# Patient Record
Sex: MALE | Race: WHITE | NOT HISPANIC OR LATINO | URBAN - NONMETROPOLITAN AREA
[De-identification: names, ages, dates, MRNs, and addresses within clinical notes are randomized per-mention and may not be internally consistent; named-entity substitution may affect disease eponyms.]

---

## 2017-01-24 RX ORDER — ATORVASTATIN CALCIUM 10 MG/1
TABLET, FILM COATED ORAL
Qty: 30 TABLET | OUTPATIENT
Start: 2017-01-24

## 2017-01-25 RX ORDER — ATORVASTATIN CALCIUM 10 MG/1
TABLET, FILM COATED ORAL
Qty: 30 TABLET | OUTPATIENT
Start: 2017-01-25

## 2017-01-26 RX ORDER — ATORVASTATIN CALCIUM 10 MG/1
TABLET, FILM COATED ORAL
Qty: 30 TABLET | OUTPATIENT
Start: 2017-01-26

## 2017-02-20 RX ORDER — VARENICLINE TARTRATE 1 MG/1
TABLET, FILM COATED ORAL
Qty: 56 TABLET | Refills: 0 | OUTPATIENT
Start: 2017-02-20

## 2017-02-22 RX ORDER — VARENICLINE TARTRATE 1 MG/1
TABLET, FILM COATED ORAL
Qty: 56 TABLET | OUTPATIENT
Start: 2017-02-22

## 2018-08-13 ENCOUNTER — OFFICE VISIT (OUTPATIENT)
Dept: UROLOGY | Facility: CLINIC | Age: 68
End: 2018-08-13

## 2018-08-13 VITALS
WEIGHT: 157 LBS | BODY MASS INDEX: 23.79 KG/M2 | SYSTOLIC BLOOD PRESSURE: 138 MMHG | TEMPERATURE: 97.4 F | HEIGHT: 68 IN | DIASTOLIC BLOOD PRESSURE: 70 MMHG

## 2018-08-13 DIAGNOSIS — R39.9 LOWER URINARY TRACT SYMPTOMS (LUTS): Primary | ICD-10-CM

## 2018-08-13 DIAGNOSIS — J44.9 CHRONIC OBSTRUCTIVE PULMONARY DISEASE, UNSPECIFIED COPD TYPE (HCC): ICD-10-CM

## 2018-08-13 DIAGNOSIS — R68.82 LOW LIBIDO: ICD-10-CM

## 2018-08-13 DIAGNOSIS — N40.0 HYPERPLASIA OF PROSTATE: ICD-10-CM

## 2018-08-13 LAB
BILIRUB BLD-MCNC: NEGATIVE MG/DL
CLARITY, POC: CLEAR
COLOR UR: YELLOW
GLUCOSE UR STRIP-MCNC: NEGATIVE MG/DL
KETONES UR QL: NEGATIVE
LEUKOCYTE EST, POC: NEGATIVE
NITRITE UR-MCNC: NEGATIVE MG/ML
PH UR: 6.5 [PH] (ref 5–8)
PROT UR STRIP-MCNC: NEGATIVE MG/DL
RBC # UR STRIP: NEGATIVE /UL
SP GR UR: 1.01 (ref 1–1.03)
UROBILINOGEN UR QL: NORMAL

## 2018-08-13 PROCEDURE — 81003 URINALYSIS AUTO W/O SCOPE: CPT | Performed by: UROLOGY

## 2018-08-13 PROCEDURE — 99204 OFFICE O/P NEW MOD 45 MIN: CPT | Performed by: UROLOGY

## 2018-08-13 RX ORDER — OXYBUTYNIN CHLORIDE 5 MG/1
5 TABLET ORAL 3 TIMES DAILY
COMMUNITY
End: 2018-08-21 | Stop reason: SINTOL

## 2018-08-13 RX ORDER — FINASTERIDE 5 MG/1
5 TABLET, FILM COATED ORAL DAILY
COMMUNITY
End: 2018-08-13 | Stop reason: HOSPADM

## 2018-08-13 RX ORDER — PREGABALIN 300 MG/1
300 CAPSULE ORAL 2 TIMES DAILY
COMMUNITY
End: 2020-10-08

## 2018-08-13 NOTE — PROGRESS NOTES
Chief Complaint  LUTS    HPI  Mr. Ray is a 68 y.o. male with history of COPD who presents with LUTS. He is currently on disability d/t COPD.   Primary symptom includes: nocturia, urinary frequency,      Previous treatments include: TURP in Dec 2017 w/ Dr. Alexander;   He says this improved stream strength, but only has been intermittent; he has ORQUIDEA after this, but only occasionally;   He has dysuria for 2-3 mo after  He he also complains of anejaculation since the TURP, and is very disturbed by this.  He was unaware that that is one of the major side effects of this surgery.     yes Incontinence  no Constipation  yes Diarrhea - sometimes stool incontinence   yes Erectile dysfunction  no History of kidney stones    Pt also c/o low libido; he also admits to low energy and difficulty concentrating/difficulty with memory  He has been battling PTx, bronchitis, PNA, COPD for the past year    Erections 3-4/10; unable to penetrate  His partner is 48 and he desires to be sexually active  He has tried viagra in the past without     IPSS Questionnaire (AUA-7):  Over the past month…    1)  How often have you had a sensation of not emptying your bladder completely after you finish urinating?  1 - Less than 1 time in 5   2)  How often have you had to urinate again less than two hours after you finished urinating? 3 - About half the time   3)  How often have you found you stopped and started again several times when you urinated?  1 - Less than 1 time in 5   4) How difficult have you found it to postpone urination?  2 - Less than half the time   5) How often have you had a weak urinary stream?  4 - More than half the time   6) How often have you had to push or strain to begin urination?  2 - Less than half the time   7) How many times did you most typically get up to urinate from the time you went to bed until the time you got up in the morning?  3 - 3 times   Total score:  0-7 mildly symptomatic    8-19 moderately symptomatic - 16     20-35 severely symptomatic     Total bother score / QOL = 6, terrible    PVR - 51    Past Medical History  Past Medical History:   Diagnosis Date   • Acid reflux    • Arthritis    • COPD (chronic obstructive pulmonary disease) (CMS/[facility])    • High cholesterol    • HTN (hypertension)    • Hypertension    • Prostate disease      He denies cardiac disease or diabetes    O2 therapy has been recommended to him for his COPD, he currently undergoes pulm PT    He c/o short term memory issues    He cannot walk a mile on flat ground and is unsure of being able to do 2 flights of stairs    Past Surgical History  Past Surgical History:   Procedure Laterality Date   • HERNIA REPAIR     • PROSTATE SURGERY         Medications    Current Outpatient Prescriptions:   •  acetaminophen-codeine (TYLENOL #3) 300-30 MG per tablet, Take 1 tablet by mouth every 4 (four) hours as needed for moderate pain (4-6)., Disp: , Rfl:   •  amlodipine-olmesartan (CLAYTON) 10-40 MG per tablet, Take 1 tablet by mouth daily., Disp: , Rfl:   •  atorvastatin (LIPITOR) 10 MG tablet, Take 10 mg by mouth daily., Disp: , Rfl:   •  finasteride (PROSCAR) 5 MG tablet, Take 5 mg by mouth Daily., Disp: , Rfl:   •  oxybutynin (DITROPAN) 5 MG tablet, Take 5 mg by mouth 3 (Three) Times a Day., Disp: , Rfl:   •  potassium citrate (UROCIT-K) 5 MEQ (540 MG) CR tablet, Take 20 mEq by mouth daily., Disp: , Rfl:   •  pregabalin (LYRICA) 300 MG capsule, Take 300 mg by mouth 2 (Two) Times a Day., Disp: , Rfl:   •  varenicline (CHANTIX) 1 MG tablet, Take 1 mg by mouth 2 (two) times a day., Disp: , Rfl:   •  Vortioxetine HBr (TRINTELLIX) 10 MG tablet, Take  by mouth., Disp: , Rfl:     Allergies  No Known Allergies    Social History  Social History     Social History   • Marital status:      Spouse name: N/A   • Number of children: N/A   • Years of education: N/A     Occupational History   • Not on file.     Social History Main Topics   • Smoking status: Current Every Day  "Smoker   • Smokeless tobacco: Never Used   • Alcohol use No   • Drug use: No   • Sexual activity: Defer     Other Topics Concern   • Not on file     Social History Narrative   • No narrative on file     He is taking chantix to try and quit, but restarted smoking    Family History  He has no family history of prostate cancer  Family History   Problem Relation Age of Onset   • Hypertension Father    • Dementia Father    • Hypertension Mother    • Dementia Mother      Review of Systems  Constitutional: No fevers or chills  Skin: Negative for rash  Endocrine: No heat/cold intolerance   Cardiovascular: Negative for chest pain or dyspnea on exertion  Respiratory: Negative for shortness of breath or wheezing  Gastrointestinal: No nausea or vomiting  Genitourinary: Negative for current gross hematuria.  Musculoskeletal: No flank pain  Neurological:  Negative for frequent headaches or dizziness  Lymph/Heme: Negative for leg swelling or calf pain.    Physical Exam  Visit Vitals  /70   Temp 97.4 °F (36.3 °C)   Ht 172.7 cm (67.99\")   Wt 71.2 kg (157 lb)   BMI 23.88 kg/m²     Constitutional: NAD, WDWN.   HEENT: NCAT. Conjunctivae normal.  MMM.    Cardiovascular: Regular rate.  Pulmonary/Chest: Respirations are even and non-labored bilaterally.  Abdominal: Soft. No distension, tenderness, masses or guarding. No CVA tenderness.  Neurological: A + O x 3.  Cranial Nerves II-XII grossly intact. Normal gait.  Extremities: JARED x 4, Warm. No clubbing.  No cyanosis.    Skin: Pink, warm and dry.  No rashes noted.  Psychiatric:  Normal mood and affect    Genitourinary  Penis: circumcised penis, glans normal, no penile discharge.  No rashes/lesions.    Testes: descended bilaterally, no masses, nontender to palpation. Remainder of scrotal contents normal. No hernia appreciated.  Rectal:  Normal tone, no masses.  Prostate:  30 grams.  Asymmetric, non-tender, R base induration,       Labs  No results found for: PSA    Assessment  Mr. "  is a 68 y.o. male who presents with LUTS, primarily urinary frequency.    His initial complaint today was urinary urgency and frequency after TURP.  He is emptying his bladder well.  He is also very disturbed and frustrated by his lack of ejaculation.  Discussed with him that this is a known and likely irreversible side effect of TURP surgery.  I would like to investigate endoscopically has prostate, prior to administering any medications for OAB.  I am concerned about the side effects of many of the anticholinergic OAB medications and his memory, along with his polypharmacy.     He also complains today of erectile dysfunction and low libido.  We discussed that his low libido could possibly be a side effect from a number of medications that he takes, commonly finasteride and blood pressure medications like his ARB can be culprits. Additionally, some of his chronic medical problems could cause low libido.  He would like to pursue workup though with blood testing.     Plan  1.  PSA today  2.  Smoking cessation counseling provided  3.  Testosterone labs   4.  Fu Dr. Dewitt to discuss if he is healthy enough for sexual activity and to discuss any medication changes that could be made in order to improve his memory and help with libido.  5.  Stop finasteride, as one of the major side effects is low libido  6.  FU for cysto; hold off on any anticholinergics for now, doug w/ memory issues; may consider Myrbetriq in future   7.  Pt will check w/ PCP to get blessing for sexual activity, prior to discussing ICI or IPP at next visit; bring pump at next visit      Francisco Herzog MD

## 2018-08-15 LAB
ESTRADIOL SERPL-MCNC: 22.9 PG/ML (ref 7.6–42.6)
HCT VFR BLD AUTO: 48.4 % (ref 42–52)
HGB BLD-MCNC: 15.4 G/DL (ref 14–18)
PSA SERPL-MCNC: 0.1 NG/ML (ref 0.06–4)
SHBG SERPL-SCNC: 108.8 NMOL/L (ref 19.3–76.4)
TESTOST FREE SERPL-MCNC: 9.6 PG/ML (ref 6.6–18.1)
TESTOST SERPL-MCNC: 796 NG/DL (ref 264–916)

## 2018-08-21 ENCOUNTER — PROCEDURE VISIT (OUTPATIENT)
Dept: UROLOGY | Facility: CLINIC | Age: 68
End: 2018-08-21

## 2018-08-21 VITALS — WEIGHT: 157 LBS | HEIGHT: 68 IN | BODY MASS INDEX: 23.79 KG/M2

## 2018-08-21 DIAGNOSIS — N52.9 ERECTILE DYSFUNCTION, UNSPECIFIED ERECTILE DYSFUNCTION TYPE: Primary | ICD-10-CM

## 2018-08-21 DIAGNOSIS — R39.9 LOWER URINARY TRACT SYMPTOMS (LUTS): ICD-10-CM

## 2018-08-21 PROCEDURE — 99215 OFFICE O/P EST HI 40 MIN: CPT | Performed by: UROLOGY

## 2018-08-21 PROCEDURE — 52310 CYSTOSCOPY AND TREATMENT: CPT | Performed by: UROLOGY

## 2018-08-21 PROCEDURE — 51741 ELECTRO-UROFLOWMETRY FIRST: CPT | Performed by: UROLOGY

## 2018-08-21 PROCEDURE — 51798 US URINE CAPACITY MEASURE: CPT | Performed by: UROLOGY

## 2018-08-21 RX ORDER — TAMSULOSIN HYDROCHLORIDE 0.4 MG/1
1 CAPSULE ORAL DAILY
Qty: 30 CAPSULE | Refills: 3 | Status: SHIPPED | OUTPATIENT
Start: 2018-08-21 | End: 2019-01-02 | Stop reason: SDUPTHER

## 2018-08-21 NOTE — PROGRESS NOTES
Chief Complaint  LUTS  ED    HPI  Mr. Ray is a 68 y.o. male with history of COPD who presents for follow up of LUTS. He is currently on disability d/t COPD.   Primary symptom includes: nocturia, urinary frequency.     He says stopping his finasteride has not helped with his libido at all.   He complains of dry mouth with taking his oxybutynin.  Memory issues as below.  Has not had any recent urinary retention.  He does admit to weak urinary stream, but the frequency and urgency bothers him the most.     He says that after his TURP with Dr. Alexander, he never experienced increase in his urinary stream or improvement in his symptoms.       To Review,  Previous treatments include: TURP in Dec 2017 w/ Dr. Alexander;   He says this improved stream strength, but only has been intermittent; he has ORQUIDEA after this, but only occasionally;   He has dysuria for 2-3 mo after  He he also complains of anejaculation since the TURP, and is very disturbed by this.  He was unaware that that is one of the major side effects of this surgery.   Pt also c/o low libido; he also admits to low energy and difficulty concentrating/difficulty with memory  He has been battling PTx, bronchitis, PNA, COPD for the past year  Erections 3-4/10; unable to penetrate  His partner is 48 and he desires to be sexually active  He has tried viagra in the past without any results    IPSS Questionnaire (AUA-7):  Over the past month…    1)  How often have you had a sensation of not emptying your bladder completely after you finish urinating?  1 - Less than 1 time in 5   2)  How often have you had to urinate again less than two hours after you finished urinating? 3 - About half the time   3)  How often have you found you stopped and started again several times when you urinated?  1 - Less than 1 time in 5   4) How difficult have you found it to postpone urination?  2 - Less than half the time   5) How often have you had a weak urinary stream?  4 - More than half  the time   6) How often have you had to push or strain to begin urination?  2 - Less than half the time   7) How many times did you most typically get up to urinate from the time you went to bed until the time you got up in the morning?  3 - 3 times   Total score:  0-7 mildly symptomatic    8-19 moderately symptomatic - 16    20-35 severely symptomatic     Total bother score / QOL = 6, terrible    Past Medical History  Past Medical History:   Diagnosis Date   • Acid reflux    • Arthritis    • COPD (chronic obstructive pulmonary disease) (CMS/[facility])    • High cholesterol    • HTN (hypertension)    • Hypertension    • Prostate disease      He denies cardiac disease or diabetes  O2 therapy has been recommended to him for his COPD, he currently undergoes pulm PT  He c/o short term memory issues  He cannot walk a mile on flat ground and is unsure of being able to do 2 flights of stairs    Past Surgical History  Past Surgical History:   Procedure Laterality Date   • HERNIA REPAIR     • PROSTATE SURGERY     • TURP / TRANSURETHRAL INCISION / DRAINAGE PROSTATE  12/2017       Medications    Current Outpatient Prescriptions:   •  acetaminophen-codeine (TYLENOL #3) 300-30 MG per tablet, Take 1 tablet by mouth every 4 (four) hours as needed for moderate pain (4-6)., Disp: , Rfl:   •  amlodipine-olmesartan (CLAYTON) 10-40 MG per tablet, Take 1 tablet by mouth daily., Disp: , Rfl:   •  atorvastatin (LIPITOR) 10 MG tablet, Take 10 mg by mouth daily., Disp: , Rfl:   •  oxybutynin (DITROPAN) 5 MG tablet, Take 5 mg by mouth 3 (Three) Times a Day., Disp: , Rfl:   •  potassium citrate (UROCIT-K) 5 MEQ (540 MG) CR tablet, Take 20 mEq by mouth daily., Disp: , Rfl:   •  pregabalin (LYRICA) 300 MG capsule, Take 300 mg by mouth 2 (Two) Times a Day., Disp: , Rfl:   •  varenicline (CHANTIX) 1 MG tablet, Take 1 mg by mouth 2 (two) times a day., Disp: , Rfl:   •  Vortioxetine HBr (TRINTELLIX) 10 MG tablet, Take  by mouth., Disp: , Rfl:      Allergies  No Known Allergies    Social History  Social History     Social History   • Marital status:      Spouse name: N/A   • Number of children: N/A   • Years of education: N/A     Occupational History   • Not on file.     Social History Main Topics   • Smoking status: Current Every Day Smoker     Packs/day: 1.00     Types: Cigarettes   • Smokeless tobacco: Never Used   • Alcohol use No   • Drug use: No   • Sexual activity: Defer     Other Topics Concern   • Not on file     Social History Narrative   • No narrative on file     He is taking chantix to try and quit, but restarted smoking    Family History  He has no family history of prostate cancer  Family History   Problem Relation Age of Onset   • Hypertension Father    • Dementia Father    • Hypertension Mother    • Dementia Mother      Review of Systems  Constitutional: No fevers or chills  Skin: Negative for rash  Endocrine: No heat/cold intolerance   Cardiovascular: Negative for chest pain or dyspnea on exertion  Respiratory: Negative for shortness of breath or wheezing  Gastrointestinal: No nausea or vomiting  Genitourinary: Negative for current gross hematuria.  Musculoskeletal: No flank pain  Neurological:  Negative for frequent headaches or dizziness  Lymph/Heme: Negative for leg swelling or calf pain.    Physical Exam  There were no vitals taken for this visit.  Constitutional: NAD, WDWN.   HEENT: NCAT. Conjunctivae normal.  MMM.    Cardiovascular: Regular rate.  Pulmonary/Chest: Respirations are even and non-labored bilaterally.  Abdominal: Soft. No distension, tenderness, masses or guarding. No CVA tenderness.  Neurological: A + O x 3.  Cranial Nerves II-XII grossly intact. Normal gait.  Extremities: JARED x 4, Warm. No clubbing.  No cyanosis.    Skin: Pink, warm and dry.  No rashes noted.  Psychiatric:  Normal mood and affect    Genitourinary  Penis: circumcised penis, glans normal, no penile discharge.  No rashes/lesions.    Testes:  descended bilaterally, no masses, nontender to palpation. Remainder of scrotal contents normal.       Labs  Lab Results   Component Value Date    PSA 0.104 08/13/2018      Ref. Range 8/13/2018 10:45   Estradiol Latest Ref Range: 7.6 - 42.6 pg/mL 22.9   Sex Hormone Binding Globulin Latest Ref Range: 19.3 - 76.4 nmol/L 108.8 (H)   Testosterone, Total Latest Ref Range: 264 - 916 ng/dL 796   Testosterone, Free Latest Ref Range: 6.6 - 18.1 pg/mL 9.6       Preprocedure diagnosis  LUTS  Hx of TURP    Postprocedure diagnosis  Prostatic regrowth  Small bladder stones    Procedure  Flexible Cystourethroscopy    Attending surgeon  Francisco Herzog MD    Anesthesia  2% lidocaine jelly intraurethrally    Complications  None    Indications  68 y.o. male undergoing a flexible cystoscopy for the above mentioned indications.  Informed consent was obtained.      Findings  Cystoscopic findings included one right and left ureteral orifice in the normal anatomic position with normal bladder mucosa and no tumors, masses or stones. The urethral urothelium was within normal limits with no strictures.  There was a prominent median lobe and evidence of prostatic regrowth, which appeared obstructive.  There were several bladder stones which had migrated down into his prostatic urethra.  We removed one of these with a flexible stent grasper, and pushed the rest back into his bladder. He did have some narrowing at his bladder neck, though not an actual contracture    Procedure  The patient was placed in supine position and prepped and draped in sterile fashion with lidocaine jelly per urethra for anesthesia.  A timeout was performed.  The 14F flexible cystoscope was lubricated and gently placed through the penile urethra and into the bladder.  The bladder was completely visualized.  The cystoscope was retroflexed and the bladder neck and prostate visualized.  The cystoscope was slowly withdrawn while visualizing the urethra and the  procedure terminated.  The patient tolerated the procedure well.      PVR - 27    Uroflow:  Peak flow rate - 6.8  Average flow rate - 2.5  Flow curve - flat and staccato       Assessment  Mr. Ray is a 68 y.o. male who presents with LUTS, primarily urinary frequency. He also has ED and low libido. AM Testosterone was normal. At last visit we stopped finasteride.     I discussed with him that prior to any surgical intervention I would like to perform urodynamics.  I think that this is indicated in men continue to have LUTS after bladder outlet procedures.  I want to make sure that he can generate adequate bladder contractions prior to performing a another TURP for prostatic regrowth.  His COPD places him at surgical risk with anesthesia.     His PCP Dr. Dewitt has cleared him for sexual activity.    Plan  1.  Stop oxybutynin; start myrbetriq 50 mg QD and flomax  2.  We have ordered tri-mix injections and he will follow-up in 2 weeks for ICI teaching  3.  We will plan to perform urodynamics once I have this available.         Francisco Herzog MD

## 2018-11-05 ENCOUNTER — OFFICE VISIT (OUTPATIENT)
Dept: UROLOGY | Facility: CLINIC | Age: 68
End: 2018-11-05

## 2018-11-05 DIAGNOSIS — Z98.890 HISTORY OF PROSTATE SURGERY: ICD-10-CM

## 2018-11-05 DIAGNOSIS — N39.41 URGE INCONTINENCE OF URINE: ICD-10-CM

## 2018-11-05 DIAGNOSIS — R39.9 LOWER URINARY TRACT SYMPTOMS (LUTS): Primary | ICD-10-CM

## 2018-11-05 PROCEDURE — 51728 CYSTOMETROGRAM W/VP: CPT | Performed by: UROLOGY

## 2018-11-05 PROCEDURE — 51784 ANAL/URINARY MUSCLE STUDY: CPT | Performed by: UROLOGY

## 2018-11-05 PROCEDURE — 51797 INTRAABDOMINAL PRESSURE TEST: CPT | Performed by: UROLOGY

## 2018-11-05 PROCEDURE — 51741 ELECTRO-UROFLOWMETRY FIRST: CPT | Performed by: UROLOGY

## 2018-11-05 NOTE — PROGRESS NOTES
Diagnostic Tests    Complex Cystometrogram, Pressure Voiding Study with (intra-abdominal voiding pressure, rectal), EMG, Uroflowometry (Calibrated):    Pre-operative diagnosis: LUTS, primarily urinary frequency; prostatic regrowth on cystoscopy after TURP in the past   Surgeon: Francisco Herzog MD   Anesthesia: None   Complications: None     Briefly, Theo Ray is a 68 y.o. y.o. male with history of LUTS, primarily urinary frequency; prostatic regrowth on cystoscopy after TURP in the past . He presents today for urodynamics.   We discussed the risks and benefits of the procedure including the risks of hematuria and infection. Pressure flow urodynamics were therefore performed.     Operative summary:     Initial Uroflowmetry:  Voided volume - 88 ml  Q max - 6.5* ml/sec  PVR by staright cath - 0 ml    The patient was prepped and draped in a standard sterile fashion. A catheter was placed with a 7 [place] double-lumen urodynamic catherter. A rectal baloon pressure transducer was placed in an atraumatic fashion. An EMG electrode was placed at 3 and 9:00 of the elsa-anal region.     Filling cystometry:   Normal saline was infused at 50cc per minute. Pressure-flow urodynamics were performed.   The patient's first sensation and desires were well below 100 cc, and maximum cystometric capacity was 130 ml. There were +++ uninhibited bladder contractions. There was a slow rise in pressure with filling; the compliance was normal.  End filling pressure was normal.  We were not able to determine VLP P because he leaked with a large D.O. so early on.     Voiding cystometry with uroflowmetry (calibrated):   At maximum capacity the patient then voided.   Max Flow: 5.4 ml/s   Average flow: 3.3 ml/s   P det at max flow: 22 cm of water   Mean P det: 25 cm of water   Voided volume: 130 ml   PVR: 0 ml   EMG activity was normal   The voiding curve was unobstructed/equivocal      He was able to void with minimal PVR, though it was not  completely clear if he was recruiting  He was able to be re-filled to 130 before having large DO and leaking completely    Impression:   1. small bladder capacity   2. normal compliance   3. equivocal contractility   4. +++ detrussor instability   5. +++ detrussor overactivity incontinence   6. no stress urinary incontinence   7. no bladder outlet obstruction   8: no DSD    Plan   - FU to discuss results, I will likely recommend Botox

## 2018-12-18 ENCOUNTER — OFFICE VISIT (OUTPATIENT)
Dept: UROLOGY | Facility: CLINIC | Age: 68
End: 2018-12-18

## 2018-12-18 VITALS
BODY MASS INDEX: 23.79 KG/M2 | WEIGHT: 157 LBS | HEART RATE: 66 BPM | OXYGEN SATURATION: 91 % | HEIGHT: 68 IN | TEMPERATURE: 97.1 F

## 2018-12-18 DIAGNOSIS — R39.9 LOWER URINARY TRACT SYMPTOMS (LUTS): Primary | ICD-10-CM

## 2018-12-18 PROCEDURE — 99213 OFFICE O/P EST LOW 20 MIN: CPT | Performed by: UROLOGY

## 2018-12-18 RX ORDER — SODIUM CHLORIDE 9 MG/ML
100 INJECTION, SOLUTION INTRAVENOUS CONTINUOUS
Status: CANCELLED | OUTPATIENT
Start: 2018-12-18

## 2018-12-18 NOTE — PROGRESS NOTES
Chief Complaint  LUTS  ED    HPI  Mr. Ray is a 68 y.o. male with history of COPD who presents for follow up of LUTS. He is currently on disability d/t COPD.   Primary symptom includes: nocturia, urinary frequency.     He was found to have prostatic regrowth with median lobe and bladder stones on cystoscopy.  He was then found to have extreme detrusor overactivity on urodynamics.   He returns today to discuss surgical treatment of his continued lower urinary tract symptoms and bladder stones.   No recent fevers or urinary infection.    To review,  He says stopping his finasteride has not helped with his libido at all.   He complains of dry mouth with taking his oxybutynin.  Memory issues as below.  Has not had any recent urinary retention.  He does admit to weak urinary stream, but the frequency and urgency bothers him the most.   He says that after his TURP with Dr. Alexander, he never experienced increase in his urinary stream or improvement in his symptoms.   Previous treatments include: TURP in Dec 2017 w/ Dr. Alexander;   He says this improved stream strength, but only has been intermittent; he has ORQUIDEA after this, but only occasionally;   He has dysuria for 2-3 mo after  He he also complains of anejaculation since the TURP, and is very disturbed by this.  He was unaware that that is one of the major side effects of this surgery.   Pt also c/o low libido; he also admits to low energy and difficulty concentrating/difficulty with memory  He has been battling PTx, bronchitis, PNA, COPD for the past year  Erections 3-4/10; unable to penetrate  His partner is 48 and he desires to be sexually active  He has tried viagra in the past without any results    IPSS Questionnaire (AUA-7):  Over the past month…    1)  How often have you had a sensation of not emptying your bladder completely after you finish urinating?  1 - Less than 1 time in 5   2)  How often have you had to urinate again less than two hours after you finished  urinating? 3 - About half the time   3)  How often have you found you stopped and started again several times when you urinated?  1 - Less than 1 time in 5   4) How difficult have you found it to postpone urination?  2 - Less than half the time   5) How often have you had a weak urinary stream?  4 - More than half the time   6) How often have you had to push or strain to begin urination?  2 - Less than half the time   7) How many times did you most typically get up to urinate from the time you went to bed until the time you got up in the morning?  3 - 3 times   Total score:  0-7 mildly symptomatic    8-19 moderately symptomatic - 16    20-35 severely symptomatic     Total bother score / QOL = 6, terrible    Past Medical History  Past Medical History:   Diagnosis Date   • Acid reflux    • Arthritis    • COPD (chronic obstructive pulmonary disease) (CMS/Formerly [facility])    • High cholesterol    • HTN (hypertension)    • Hypertension    • Prostate disease      He denies cardiac disease or diabetes  O2 therapy has been recommended to him for his COPD, he currently undergoes pulm PT  He c/o short term memory issues  He cannot walk a mile on flat ground and is unsure of being able to do 2 flights of stairs    Past Surgical History  Past Surgical History:   Procedure Laterality Date   • HERNIA REPAIR     • PROSTATE SURGERY     • TURP / TRANSURETHRAL INCISION / DRAINAGE PROSTATE  12/2017       Medications    Current Outpatient Medications:   •  acetaminophen-codeine (TYLENOL #3) 300-30 MG per tablet, Take 1 tablet by mouth every 4 (four) hours as needed for moderate pain (4-6)., Disp: , Rfl:   •  amlodipine-olmesartan (CLAYTON) 10-40 MG per tablet, Take 1 tablet by mouth daily., Disp: , Rfl:   •  atorvastatin (LIPITOR) 10 MG tablet, Take 10 mg by mouth daily., Disp: , Rfl:   •  Mirabegron ER (MYRBETRIQ) 50 MG tablet sustained-release 24 hour 24 hr tablet, Take 50 mg by mouth Daily., Disp: 30 tablet, Rfl: 3  •  potassium citrate (UROCIT-K)  5 MEQ (540 MG) CR tablet, Take 20 mEq by mouth daily., Disp: , Rfl:   •  pregabalin (LYRICA) 300 MG capsule, Take 300 mg by mouth 2 (Two) Times a Day., Disp: , Rfl:   •  tamsulosin (FLOMAX) 0.4 MG capsule 24 hr capsule, Take 1 capsule by mouth Daily., Disp: 30 capsule, Rfl: 3  •  varenicline (CHANTIX) 1 MG tablet, Take 1 mg by mouth 2 (two) times a day., Disp: , Rfl:   •  Vortioxetine HBr (TRINTELLIX) 10 MG tablet, Take  by mouth., Disp: , Rfl:     Allergies  No Known Allergies    Social History  Social History     Socioeconomic History   • Marital status:      Spouse name: Not on file   • Number of children: Not on file   • Years of education: Not on file   • Highest education level: Not on file   Social Needs   • Financial resource strain: Not on file   • Food insecurity - worry: Not on file   • Food insecurity - inability: Not on file   • Transportation needs - medical: Not on file   • Transportation needs - non-medical: Not on file   Occupational History   • Not on file   Tobacco Use   • Smoking status: Current Every Day Smoker     Packs/day: 1.00     Types: Cigarettes   • Smokeless tobacco: Never Used   Substance and Sexual Activity   • Alcohol use: No   • Drug use: No   • Sexual activity: Defer   Other Topics Concern   • Not on file   Social History Narrative   • Not on file     He is taking chantix to try and quit, but restarted smoking    Family History  He has no family history of prostate cancer  Family History   Problem Relation Age of Onset   • Hypertension Father    • Dementia Father    • Hypertension Mother    • Dementia Mother      Review of Systems  Constitutional: No fevers or chills  Skin: Negative for rash  Endocrine: No heat/cold intolerance   Cardiovascular: Negative for chest pain or dyspnea on exertion  Respiratory: Negative for shortness of breath or wheezing  Gastrointestinal: No nausea or vomiting  Genitourinary: Negative for current gross hematuria.  Musculoskeletal: No flank  "pain  Neurological:  Negative for frequent headaches or dizziness  Lymph/Heme: Negative for leg swelling or calf pain.    Physical Exam  Visit Vitals  Pulse 66   Temp 97.1 °F (36.2 °C)   Ht 172.7 cm (67.99\")   Wt 71.2 kg (157 lb)   SpO2 91%   BMI 23.88 kg/m²     Constitutional: NAD, WDWN.   HEENT: NCAT. Conjunctivae normal.  MMM.    Cardiovascular: Regular rate.  Pulmonary/Chest: Respirations are even and non-labored bilaterally.  Abdominal: Soft. No distension, tenderness, masses or guarding. No CVA tenderness.  Neurological: A + O x 3.  Cranial Nerves II-XII grossly intact. Normal gait.  Extremities: JARED x 4, Warm. No clubbing.  No cyanosis.    Skin: Pink, warm and dry.  No rashes noted.  Psychiatric:  Normal mood and affect      Labs  Lab Results   Component Value Date    PSA 0.104 08/13/2018      Ref. Range 8/13/2018 10:45   Estradiol Latest Ref Range: 7.6 - 42.6 pg/mL 22.9   Sex Hormone Binding Globulin Latest Ref Range: 19.3 - 76.4 nmol/L 108.8 (H)   Testosterone, Total Latest Ref Range: 264 - 916 ng/dL 796   Testosterone, Free Latest Ref Range: 6.6 - 18.1 pg/mL 9.6         Assessment  Mr. Ray is a 68 y.o. male who presents with LUTS, primarily urinary frequency. He also has ED and low libido. AM Testosterone was normal. At last visit we stopped finasteride. He has bladder stones and prostatic regrowth after TURP, and extreme DO on UDS, such that his bladder could only be filled to 130 mL.     We will plan to treat the bladder stones and the prostate first to see if his urgency gets better. If not we will do botox.     Plan  1.  UCx today in PAT  2.  Schedule for TURP and cystolitholapaxy; we will plan for 1/9/18.  I am hopeful that I will have the Trilogy lithotrite that day  3. PAT and pulm clearance w/ Dr. Vicky Herzog MD  "

## 2018-12-23 ENCOUNTER — RESULTS ENCOUNTER (OUTPATIENT)
Dept: UROLOGY | Facility: CLINIC | Age: 68
End: 2018-12-23

## 2018-12-23 DIAGNOSIS — R39.9 LOWER URINARY TRACT SYMPTOMS (LUTS): ICD-10-CM

## 2018-12-31 DIAGNOSIS — R39.9 LOWER URINARY TRACT SYMPTOMS (LUTS): ICD-10-CM

## 2019-01-02 RX ORDER — MIRABEGRON 50 MG/1
TABLET, FILM COATED, EXTENDED RELEASE ORAL
Qty: 30 TABLET | Refills: 3 | Status: SHIPPED | OUTPATIENT
Start: 2019-01-02 | End: 2020-10-08

## 2019-01-02 RX ORDER — TAMSULOSIN HYDROCHLORIDE 0.4 MG/1
1 CAPSULE ORAL DAILY
Qty: 30 CAPSULE | Refills: 3 | Status: SHIPPED | OUTPATIENT
Start: 2019-01-02 | End: 2020-10-08

## 2019-01-21 ENCOUNTER — HOSPITAL ENCOUNTER (OUTPATIENT)
Dept: GENERAL RADIOLOGY | Facility: HOSPITAL | Age: 69
Discharge: HOME | End: 2019-01-21
Attending: UROLOGY | Admitting: UROLOGY

## 2019-01-21 ENCOUNTER — OFFICE VISIT (OUTPATIENT)
Dept: UROLOGY | Facility: CLINIC | Age: 69
End: 2019-01-21

## 2019-01-21 VITALS
OXYGEN SATURATION: 94 % | BODY MASS INDEX: 23.79 KG/M2 | HEIGHT: 68 IN | TEMPERATURE: 98.1 F | WEIGHT: 157 LBS | HEART RATE: 91 BPM

## 2019-01-21 DIAGNOSIS — R39.9 LOWER URINARY TRACT SYMPTOMS (LUTS): Primary | ICD-10-CM

## 2019-01-21 DIAGNOSIS — N21.0 BLADDER STONE: Primary | ICD-10-CM

## 2019-01-21 DIAGNOSIS — N21.0 BLADDER STONES: ICD-10-CM

## 2019-01-21 PROCEDURE — 99215 OFFICE O/P EST HI 40 MIN: CPT | Performed by: UROLOGY

## 2019-01-21 PROCEDURE — 74018 RADEX ABDOMEN 1 VIEW: CPT

## 2019-01-21 NOTE — PROGRESS NOTES
Chief Complaint  LUTS  ED    HPI  Mr. Ray is a 68 y.o. male with history of COPD who presents for follow up of LUTS. He is currently on disability d/t COPD.   Primary symptom includes: nocturia, urinary frequency.     He was found to have prostatic regrowth with median lobe and bladder stones on cystoscopy.  He was then found to have extreme detrusor overactivity on urodynamics.   He returns today to discuss surgical treatment of his continued lower urinary tract symptoms and bladder stones. No recent fevers or urinary infection.    To review,  He says stopping his finasteride has not helped with his libido at all.   He complains of dry mouth with taking his oxybutynin.  Memory issues as below.  Has not had any recent urinary retention.  He does admit to weak urinary stream, but the frequency and urgency bothers him the most.   He says that after his TURP with Dr. Alexander, he never experienced increase in his urinary stream or improvement in his symptoms.   Previous treatments include: TURP in Dec 2017 w/ Dr. Alexander;   He says this improved stream strength, but only has been intermittent; he has ORQUIDEA after this, but only occasionally;   He has dysuria for 2-3 mo after  He he also complains of anejaculation since the TURP, and is very disturbed by this.  He was unaware that that is one of the major side effects of this surgery.   Pt also c/o low libido; he also admits to low energy and difficulty concentrating/difficulty with memory  He has been battling PTx, bronchitis, PNA, COPD for the past year  Erections 3-4/10; unable to penetrate  His partner is 48 and he desires to be sexually active  He has tried viagra in the past without any results    IPSS Questionnaire (AUA-7):  Over the past month…    1)  How often have you had a sensation of not emptying your bladder completely after you finish urinating?  1 - Less than 1 time in 5   2)  How often have you had to urinate again less than two hours after you finished  urinating? 3 - About half the time   3)  How often have you found you stopped and started again several times when you urinated?  1 - Less than 1 time in 5   4) How difficult have you found it to postpone urination?  2 - Less than half the time   5) How often have you had a weak urinary stream?  4 - More than half the time   6) How often have you had to push or strain to begin urination?  2 - Less than half the time   7) How many times did you most typically get up to urinate from the time you went to bed until the time you got up in the morning?  3 - 3 times   Total score:  0-7 mildly symptomatic    8-19 moderately symptomatic - 16    20-35 severely symptomatic     Total bother score / QOL = 6, terrible    Past Medical History  Past Medical History:   Diagnosis Date   • Acid reflux    • Arthritis    • COPD (chronic obstructive pulmonary disease) (CMS/Formerly [facility])    • High cholesterol    • HTN (hypertension)    • Hypertension    • Prostate disease      He denies cardiac disease or diabetes  O2 therapy has been recommended to him for his COPD, he currently undergoes pulm PT  He c/o short term memory issues  He cannot walk a mile on flat ground and is unsure of being able to do 2 flights of stairs    Past Surgical History  Past Surgical History:   Procedure Laterality Date   • HERNIA REPAIR     • PROSTATE SURGERY     • TURP / TRANSURETHRAL INCISION / DRAINAGE PROSTATE  12/2017       Medications    Current Outpatient Medications:   •  acetaminophen-codeine (TYLENOL #3) 300-30 MG per tablet, Take 1 tablet by mouth every 4 (four) hours as needed for moderate pain (4-6)., Disp: , Rfl:   •  amlodipine-olmesartan (CLAYTON) 10-40 MG per tablet, Take 1 tablet by mouth daily., Disp: , Rfl:   •  atorvastatin (LIPITOR) 10 MG tablet, Take 10 mg by mouth daily., Disp: , Rfl:   •  MYRBETRIQ 50 MG tablet sustained-release 24 hour 24 hr tablet, TAKE ONE TABLET ONCE DAILY, Disp: 30 tablet, Rfl: 3  •  potassium citrate (UROCIT-K) 5 MEQ (540 MG)  CR tablet, Take 20 mEq by mouth daily., Disp: , Rfl:   •  pregabalin (LYRICA) 300 MG capsule, Take 300 mg by mouth 2 (Two) Times a Day., Disp: , Rfl:   •  tamsulosin (FLOMAX) 0.4 MG capsule 24 hr capsule, TAKE 1 CAPSULE BY MOUTH DAILY., Disp: 30 capsule, Rfl: 3  •  varenicline (CHANTIX) 1 MG tablet, Take 1 mg by mouth 2 (two) times a day., Disp: , Rfl:   •  Vortioxetine HBr (TRINTELLIX) 10 MG tablet, Take  by mouth., Disp: , Rfl:     Allergies  No Known Allergies    Social History  Social History     Socioeconomic History   • Marital status:      Spouse name: Not on file   • Number of children: Not on file   • Years of education: Not on file   • Highest education level: Not on file   Social Needs   • Financial resource strain: Not on file   • Food insecurity - worry: Not on file   • Food insecurity - inability: Not on file   • Transportation needs - medical: Not on file   • Transportation needs - non-medical: Not on file   Occupational History   • Not on file   Tobacco Use   • Smoking status: Current Every Day Smoker     Packs/day: 1.00     Types: Cigarettes   • Smokeless tobacco: Never Used   Substance and Sexual Activity   • Alcohol use: No   • Drug use: No   • Sexual activity: Defer   Other Topics Concern   • Not on file   Social History Narrative   • Not on file     He is taking chantix to try and quit, but restarted smoking    Family History  He has no family history of prostate cancer  Family History   Problem Relation Age of Onset   • Hypertension Father    • Dementia Father    • Hypertension Mother    • Dementia Mother      Review of Systems  Constitutional: No fevers or chills  Skin: Negative for rash  Endocrine: No heat/cold intolerance   Cardiovascular: Negative for chest pain or dyspnea on exertion  Respiratory: Negative for shortness of breath or wheezing  Gastrointestinal: No nausea or vomiting  Genitourinary: Negative for current gross hematuria.  Musculoskeletal: No flank pain  Neurological:   "Negative for frequent headaches or dizziness  Lymph/Heme: Negative for leg swelling or calf pain.    Physical Exam  Visit Vitals  Pulse 91   Temp 98.1 °F (36.7 °C)   Ht 172.7 cm (67.99\")   Wt 71.2 kg (157 lb)   SpO2 94%   BMI 23.88 kg/m²     Constitutional: NAD, WDWN.   HEENT: NCAT. Conjunctivae normal.  MMM.    Cardiovascular: Regular rate.  Pulmonary/Chest: Respirations are even and non-labored bilaterally.  Abdominal: Soft. No distension, tenderness, masses or guarding. No CVA tenderness.  Neurological: A + O x 3.  Cranial Nerves II-XII grossly intact. Normal gait.  Extremities: JARED x 4, Warm. No clubbing.  No cyanosis.    Skin: Pink, warm and dry.  No rashes noted.  Psychiatric:  Normal mood and affect      Labs  Lab Results   Component Value Date    PSA 0.104 08/13/2018      Ref. Range 8/13/2018 10:45   Estradiol Latest Ref Range: 7.6 - 42.6 pg/mL 22.9   Sex Hormone Binding Globulin Latest Ref Range: 19.3 - 76.4 nmol/L 108.8 (H)   Testosterone, Total Latest Ref Range: 264 - 916 ng/dL 796   Testosterone, Free Latest Ref Range: 6.6 - 18.1 pg/mL 9.6       Cystoscopic findings included one right and left ureteral orifice in the normal anatomic position with normal bladder mucosa and no tumors, masses or stones. The urethral urothelium was within normal limits with no strictures.  There was a prominent median lobe and evidence of prostatic regrowth, which appeared obstructive.  There were several bladder stones which had migrated down into his prostatic urethra.  We removed one of these with a flexible stent grasper, and pushed the rest back into his bladder. He did have some narrowing at his bladder neck, though not an actual contracture      Assessment  Mr. Ray is a 68 y.o. male who presents with LUTS, primarily urinary frequency. He also has ED and low libido. AM Testosterone was normal. At last visit we stopped finasteride. He has bladder stones and prostatic regrowth after TURP, and extreme DO on UDS, such " that his bladder could only be filled to 130 mL. he was supposed to have this procedure on 1/9/19, but he had bronchitis and canceled.  He returns today with his fiancée and cannot decide if he wants the procedure now or not.  He has not obtained pulmonary clearance.  He continues to smoke.     We would plan to treat the bladder stones and the prostate first to see if his urgency gets better. If not we will do botox.     Plan  1.  Call and make an appt to see me to talk about rescheduling surgery  2.  Make an appt for pulmonary clearance with Dr. Dewitt    I spent a total of 40 minutes with the patient in face-to-face interaction, with >50% of the time spent in engaging in counseling on the risks, benefits, and alternatives of the therapy and coordinating care.       Francisco Herzog MD

## 2020-10-07 NOTE — PROGRESS NOTES
[facility] Cardiology   Consult  Theo Ray  1950    VISIT DATE:  10/08/20    PCP:   [facility]ides [facility]  [address] [facility] [address]        CC:  Shortness of Breath      Problem List:  1.  Severe air trapping and obstruction on PFTs.  Severely decreased diffusion capacity  2. HTN  3.  HLD  4.  Former smoker, 1 year aqo quit    History of Present Illness:  Theo Ray  Is a 70 y.o. male with pertinent cardiac history detailed above.  Patient referred by Dr. Waddell.  He is followed for severe COPD.  He is on chronic oxygen at night.  Has fairly chronic SOB, worsening over the last year.  With any activity gets severely winded. He gets mild chest pressure, no pain. He reports a stress test several years ago which he reports was normal.  EKG today with inferior and lateral ST depression between 0.5 and 1 mm.  Denies any active chest pain or pressure.  Discussed options for further evaluating for CAD including cardiac catheterization given his high pretest probability and baseline abnormal EKG.  He is hesitant to pursue this but is agreeable to further risk stratification with a repeat stress test.  Heart rate was elevated on initial exam but within normal limits on recheck.      Patient Active Problem List    Diagnosis Date Noted   • Lower urinary tract symptoms (LUTS) 12/18/2018     Note Last Updated: 12/18/2018     Added automatically from request for surgery [id number]     • Foot pain, bilateral 08/06/2016       No Known Allergies    Social History     Socioeconomic History   • Marital status:      Spouse name: Not on file   • Number of children: Not on file   • Years of education: Not on file   • Highest education level: Not on file   Tobacco Use   • Smoking status: Current Every Day Smoker     Packs/day: 1.00     Types: Cigarettes   • Smokeless tobacco: Never Used   Substance and Sexual Activity   • Alcohol use: No   • Drug use: No   • Sexual activity: Defer  "      Family History   Problem Relation Age of Onset   • Hypertension Father    • Dementia Father    • Hypertension Mother    • Dementia Mother        Current Medications:    Current Outpatient Medications:   •  acetaminophen-codeine (TYLENOL #3) 300-30 MG per tablet, Take 1 tablet by mouth every 4 (four) hours as needed for moderate pain (4-6)., Disp: , Rfl:   •  albuterol sulfate  (90 Base) MCG/ACT inhaler, , Disp: , Rfl:   •  amlodipine-olmesartan (CLAYTON) 10-40 MG per tablet, Take 1 tablet by mouth daily., Disp: , Rfl:   •  buPROPion XL (WELLBUTRIN XL) 300 MG 24 hr tablet, , Disp: , Rfl:   •  cloNIDine (CATAPRES) 0.1 MG tablet, Take 0.1 mg by mouth 3 (Three) Times a Day As Needed., Disp: , Rfl:   •  ipratropium-albuterol (DUO-NEB) 0.5-2.5 mg/3 ml nebulizer, , Disp: , Rfl:   •  l-methylfolate-B6-B12 (FOLTANX) 3-35-2 MG tablet tablet, , Disp: , Rfl:   •  metoprolol tartrate (LOPRESSOR) 25 MG tablet, Take 25 mg by mouth Daily., Disp: , Rfl:   •  potassium chloride ER (K-TAB) 20 MEQ tablet controlled-release ER tablet, Take 20 mEq by mouth Daily., Disp: , Rfl:   •  predniSONE (DELTASONE) 10 MG tablet, Take 10 mg by mouth Daily., Disp: , Rfl:      Review of Systems   Cardiovascular: Positive for dyspnea on exertion. Negative for chest pain, irregular heartbeat, leg swelling, near-syncope and orthopnea.   Respiratory: Positive for shortness of breath. Negative for sputum production.    All other systems reviewed and are negative.      Vitals:    10/08/20 1400   BP: 142/80   BP Location: Left arm   Patient Position: Sitting   Pulse: 107   Weight: 74.4 kg (164 lb)   Height: 172.7 cm (68\")       Physical Exam  Constitutional:       Appearance: Normal appearance.   HENT:      Head: Normocephalic and atraumatic.   Neck:      Vascular: No carotid bruit.   Cardiovascular:      Rate and Rhythm: Normal rate and regular rhythm.      Pulses: Normal pulses.      Heart sounds: No murmur.   Pulmonary:      Effort: Pulmonary " effort is normal.      Comments: Globally decreased breath sounds  Musculoskeletal:      Right lower leg: No edema.      Left lower leg: No edema.   Skin:     General: Skin is warm and dry.   Neurological:      General: No focal deficit present.      Mental Status: He is alert and oriented to person, place, and time.         Diagnostic Data:    ECG 12 Lead    Date/Time: 10/8/2020 2:17 PM  Performed by: Justin Juares MD  Authorized by: Justin Juares MD   Previous ECG: no previous ECG available  Rhythm: sinus rhythm  Rate: normal  BPM: 107  Q waves: V1 and V2    ST Depression: II, III, aVF, V4, V5 and V6  QRS axis: normal    Clinical impression: abnormal EKG          No results found for: CHLPL, TRIG, HDL, LDLDIRECT  No results found for: GLUCOSE, BUN, CREATININE, NA, K, CL, CO2, CREATININE, BUN  No results found for: HGBA1C  Lab Results   Component Value Date    HGB 15.4 08/13/2018    HCT 48.4 08/13/2018       Assessment:  No diagnosis found.    Plan:      1.  Dyspnea on exertion  2.  Advanced COPD  3.  Abnormal baseline EKG with inferior and lateral ST depression 0.5 to 1 mm.  Possible prior anterior infarct  4.  Hypertension  5.  Hyperlipidemia    Plan:  -Start aspirin 81 mg daily  -Continue current metoprolol dose and atorvastatin  -Dyspnea on exertion in part related to advanced lung disease, cannot exclude a contribution from underlying CAD.  -With tobacco abuse and other risk factors pretest probability for CAD is high, discussed options for diagnosis and management including a cardiac catheterization, the patient is hesitant to pursue this.  He is agreeable to further risk stratification with a stress myocardial perfusion study.  Discussed that if this is abnormal I would again recommend cardiac cath  -Continue current pulmonary regimen  -Check echo at same time as stress test    Follow-up in office 2 months        Justin Juares MD

## 2020-10-08 ENCOUNTER — CONSULT (OUTPATIENT)
Dept: CARDIOLOGY | Facility: CLINIC | Age: 70
End: 2020-10-08

## 2020-10-08 VITALS
BODY MASS INDEX: 24.86 KG/M2 | HEART RATE: 107 BPM | DIASTOLIC BLOOD PRESSURE: 80 MMHG | WEIGHT: 164 LBS | HEIGHT: 68 IN | SYSTOLIC BLOOD PRESSURE: 142 MMHG

## 2020-10-08 DIAGNOSIS — R06.09 DYSPNEA ON EXERTION: Primary | ICD-10-CM

## 2020-10-08 PROCEDURE — 99204 OFFICE O/P NEW MOD 45 MIN: CPT | Performed by: INTERNAL MEDICINE

## 2020-10-08 PROCEDURE — 93000 ELECTROCARDIOGRAM COMPLETE: CPT | Performed by: INTERNAL MEDICINE

## 2020-10-08 RX ORDER — CLONIDINE HYDROCHLORIDE 0.1 MG/1
0.1 TABLET ORAL 3 TIMES DAILY PRN
COMMUNITY
Start: 2020-09-28

## 2020-10-08 RX ORDER — POTASSIUM CHLORIDE 1500 MG/1
20 TABLET, FILM COATED, EXTENDED RELEASE ORAL DAILY
COMMUNITY
Start: 2020-09-19

## 2020-10-08 RX ORDER — PREDNISONE 10 MG/1
10 TABLET ORAL DAILY
COMMUNITY
Start: 2020-10-02

## 2020-10-08 RX ORDER — ALBUTEROL SULFATE 90 UG/1
AEROSOL, METERED RESPIRATORY (INHALATION)
COMMUNITY
Start: 2020-08-31

## 2020-10-08 RX ORDER — MECOBAL/LEVOMEFOLAT CA/B6 PHOS 2-3-35 MG
TABLET ORAL
COMMUNITY
Start: 2020-10-02

## 2020-10-08 RX ORDER — ATORVASTATIN CALCIUM 10 MG/1
10 TABLET, FILM COATED ORAL DAILY
COMMUNITY

## 2020-10-08 RX ORDER — BUPROPION HYDROCHLORIDE 300 MG/1
TABLET ORAL
COMMUNITY
Start: 2020-09-17

## 2020-10-08 RX ORDER — IPRATROPIUM BROMIDE AND ALBUTEROL SULFATE 2.5; .5 MG/3ML; MG/3ML
SOLUTION RESPIRATORY (INHALATION)
COMMUNITY
Start: 2020-08-18

## 2020-10-08 RX ORDER — ASPIRIN 81 MG/1
81 TABLET, CHEWABLE ORAL DAILY
COMMUNITY

## 2020-11-03 ENCOUNTER — OUTSIDE FACILITY SERVICE (OUTPATIENT)
Dept: CARDIOLOGY | Facility: CLINIC | Age: 70
End: 2020-11-03

## 2020-11-03 PROCEDURE — 78452 HT MUSCLE IMAGE SPECT MULT: CPT | Performed by: INTERNAL MEDICINE

## 2020-11-03 PROCEDURE — 93016 CV STRESS TEST SUPVJ ONLY: CPT | Performed by: INTERNAL MEDICINE

## 2020-11-03 PROCEDURE — 93307 TTE W/O DOPPLER COMPLETE: CPT | Performed by: INTERNAL MEDICINE

## 2020-11-03 PROCEDURE — 93018 CV STRESS TEST I&R ONLY: CPT | Performed by: INTERNAL MEDICINE
